# Patient Record
Sex: MALE | Race: WHITE | NOT HISPANIC OR LATINO | Employment: FULL TIME | URBAN - METROPOLITAN AREA
[De-identification: names, ages, dates, MRNs, and addresses within clinical notes are randomized per-mention and may not be internally consistent; named-entity substitution may affect disease eponyms.]

---

## 2025-02-25 PROBLEM — G43.719 INTRACTABLE CHRONIC MIGRAINE WITHOUT AURA AND WITHOUT STATUS MIGRAINOSUS: Status: ACTIVE | Noted: 2019-04-08

## 2025-02-25 PROBLEM — F41.8 DEPRESSION WITH ANXIETY: Status: ACTIVE | Noted: 2025-02-25

## 2025-02-25 PROBLEM — I10 BENIGN HYPERTENSION: Status: ACTIVE | Noted: 2025-02-25

## 2025-02-25 PROBLEM — M54.2 NECK PAIN: Status: ACTIVE | Noted: 2025-02-25

## 2025-02-25 PROBLEM — R53.83 FATIGUE: Status: ACTIVE | Noted: 2025-02-25

## 2025-02-25 NOTE — PROGRESS NOTES
Subjective   Patient ID:   Joshua Myles is a 44 y.o. male who presents for No chief complaint on file..  HPI  Previous [facility] patient.  Has not been seen since Feb 2023.    Neck pain:  Last x-ray in Feb 2021 showed mild retrolisthesis.  He has gone through PT.  Feeling better overall.  Was given Robaxin and Prednisone in the past.  No acute injury.  Took Excedrin without relief.  Movements make the pain worse.     Headaches:  Taking Imitrex which helps.  Had been seeing neurology.  These have been getting worse.  Has tried and failed Topamax.     HTN:  I refilled his BP medications last visit.  Taking Olmesartan-hydrochlorothiazide and Amlodipine.  BP today is      Fatigue:  Symptoms x years.  No recent labs.  Feeling sleepy during the day.     Depression/anxiety:  Taking Wellbutrin.  Denies SI/HI.     Health maintenance:  Smoking: Former smoker.  Labs: DUE  Influenza:     Review of Systems  12 point review of systems negative unless stated above in HPI    There were no vitals filed for this visit.    Physical Exam  General: Alert and oriented, well nourished, no acute distress.  Lungs: Clear to auscultation, non-labored respiration.  Heart: Normal rate, regular rhythm, no murmur, gallop or edema.  Neurologic: Awake, alert, and oriented X3, CN II-XII intact.  Psychiatric: Cooperative, appropriate mood and affect.    Assessment/Plan   Diagnoses and all orders for this visit:  Benign hypertension  Depression with anxiety  Chronic fatigue  Intractable chronic migraine without aura and without status migrainosus  Neck pain

## 2025-03-03 ENCOUNTER — APPOINTMENT (OUTPATIENT)
Dept: PRIMARY CARE | Facility: CLINIC | Age: 45
End: 2025-03-03

## 2025-03-03 DIAGNOSIS — M54.2 NECK PAIN: ICD-10-CM

## 2025-03-03 DIAGNOSIS — I10 BENIGN HYPERTENSION: Primary | ICD-10-CM

## 2025-03-03 DIAGNOSIS — G43.719 INTRACTABLE CHRONIC MIGRAINE WITHOUT AURA AND WITHOUT STATUS MIGRAINOSUS: ICD-10-CM

## 2025-03-03 DIAGNOSIS — F41.8 DEPRESSION WITH ANXIETY: ICD-10-CM

## 2025-03-03 DIAGNOSIS — R53.82 CHRONIC FATIGUE: ICD-10-CM

## 2025-03-24 NOTE — PROGRESS NOTES
Subjective   Patient ID:   Joshua Myles is a 44 y.o. male who presents for Establish Care, Migraine, and Hypertension.  HPI  Previous [facility] patient.  Has not been seen since Feb 2023.    Neck pain:  Last x-ray in Feb 2021 showed mild retrolisthesis.  He has gone through PT.  Was given Robaxin and Prednisone in the past.  No acute injury.  Took Excedrin without relief.  Movements make the pain worse.     Headaches:  Had previously been on Imitrex.  Had been seeing neurology.  These have been getting worse.  Has tried and failed Topamax.     HTN:  Supposed to be taking Olmesartan-hydrochlorothiazide and Amlodipine.  Has not had these in close to a year due to insurance.  BP today is 174/100.  He does get headaches frequently.     Fatigue:  Symptoms x years.  No recent labs.  Feeling sleepy during the day.     Depression/anxiety:  Taking Wellbutrin.  Denies SI/HI.     Health maintenance:  Smoking: Former smoker.  Labs: DUE  Influenza:     Review of Systems  12 point review of systems negative unless stated above in HPI    Vitals:    03/31/25 1013   BP: (!) 174/100   Pulse: 79   SpO2: 97%     Physical Exam  General: Alert and oriented, well nourished, no acute distress.  Lungs: Clear to auscultation, non-labored respiration.  Heart: Normal rate, regular rhythm, no murmur, gallop or edema.  Neurologic: Awake, alert, and oriented X3, CN II-XII intact.  Psychiatric: Cooperative, appropriate mood and affect.    Assessment/Plan   It was good seeing you again!  Your BP is high today.  Please start monitoring BP at home.  Call if getting over 140/90 regularly.  I have sent in Olmesartan-hydrochlorothiazide to restart.  I have also sent in Imitrex to restart.  I have placed a referral to physical therapy for further management.  I have ordered some labs to be done as soon as you can.  We will call you with the results.  If symptoms persist or worsen despite current plan of care, please contact your healthcare provider for  further evaluation.  Patient instructed to contact the office if there are any questions regarding their care or treatment.   [facility] [phone]    Fu 1 month for BP/headaches  Diagnoses and all orders for this visit:  Benign hypertension  -     olmesartan-hydrochlorothiazide (BENIcar HCT) 20-12.5 mg tablet; Take 1 tablet by mouth once daily.  Neck pain  -     Referral to Physical Therapy; Future  Intractable chronic migraine without aura and without status migrainosus  -     SUMAtriptan (Imitrex) 100 mg tablet; Take 1 tablet (100 mg) by mouth 1 time if needed for migraine.  Chronic fatigue  -     Comprehensive Metabolic Panel; Future  -     CBC and Auto Differential; Future  -     TSH with reflex to Free T4 if abnormal; Future  -     Vitamin D 25-Hydroxy,Total (for eval of Vitamin D levels); Future  -     Vitamin B12; Future  Depression with anxiety  Lipid screening  -     Lipid Panel; Future

## 2025-03-31 ENCOUNTER — OFFICE VISIT (OUTPATIENT)
Dept: PRIMARY CARE | Facility: CLINIC | Age: 45
End: 2025-03-31

## 2025-03-31 VITALS
OXYGEN SATURATION: 97 % | DIASTOLIC BLOOD PRESSURE: 100 MMHG | BODY MASS INDEX: 25.81 KG/M2 | HEART RATE: 79 BPM | WEIGHT: 190.3 LBS | SYSTOLIC BLOOD PRESSURE: 174 MMHG

## 2025-03-31 DIAGNOSIS — I10 BENIGN HYPERTENSION: Primary | ICD-10-CM

## 2025-03-31 DIAGNOSIS — F41.8 DEPRESSION WITH ANXIETY: ICD-10-CM

## 2025-03-31 DIAGNOSIS — M54.2 NECK PAIN: ICD-10-CM

## 2025-03-31 DIAGNOSIS — Z13.220 LIPID SCREENING: ICD-10-CM

## 2025-03-31 DIAGNOSIS — R53.82 CHRONIC FATIGUE: ICD-10-CM

## 2025-03-31 DIAGNOSIS — G43.719 INTRACTABLE CHRONIC MIGRAINE WITHOUT AURA AND WITHOUT STATUS MIGRAINOSUS: ICD-10-CM

## 2025-03-31 PROCEDURE — 3080F DIAST BP >= 90 MM HG: CPT | Performed by: PHYSICIAN ASSISTANT

## 2025-03-31 PROCEDURE — 3077F SYST BP >= 140 MM HG: CPT | Performed by: PHYSICIAN ASSISTANT

## 2025-03-31 PROCEDURE — 99214 OFFICE O/P EST MOD 30 MIN: CPT | Performed by: PHYSICIAN ASSISTANT

## 2025-03-31 PROCEDURE — 1036F TOBACCO NON-USER: CPT | Performed by: PHYSICIAN ASSISTANT

## 2025-03-31 RX ORDER — OLMESARTAN MEDOXOMIL AND HYDROCHLOROTHIAZIDE 20/12.5 20; 12.5 MG/1; MG/1
1 TABLET ORAL DAILY
Qty: 90 TABLET | Refills: 0 | Status: SHIPPED | OUTPATIENT
Start: 2025-03-31 | End: 2025-06-29

## 2025-03-31 RX ORDER — SUMATRIPTAN SUCCINATE 100 MG/1
100 TABLET ORAL ONCE AS NEEDED
Qty: 9 TABLET | Refills: 2 | Status: SHIPPED | OUTPATIENT
Start: 2025-03-31 | End: 2026-03-31

## 2025-03-31 ASSESSMENT — LIFESTYLE VARIABLES
HOW MANY STANDARD DRINKS CONTAINING ALCOHOL DO YOU HAVE ON A TYPICAL DAY: PATIENT DOES NOT DRINK
HOW OFTEN DO YOU HAVE SIX OR MORE DRINKS ON ONE OCCASION: NEVER
HOW OFTEN DO YOU HAVE A DRINK CONTAINING ALCOHOL: NEVER
SKIP TO QUESTIONS 9-10: 1
AUDIT-C TOTAL SCORE: 0

## 2025-03-31 ASSESSMENT — PATIENT HEALTH QUESTIONNAIRE - PHQ9
1. LITTLE INTEREST OR PLEASURE IN DOING THINGS: NOT AT ALL
2. FEELING DOWN, DEPRESSED OR HOPELESS: NOT AT ALL
SUM OF ALL RESPONSES TO PHQ9 QUESTIONS 1 AND 2: 0

## 2025-03-31 ASSESSMENT — PAIN SCALES - GENERAL: PAINLEVEL_OUTOF10: 2

## 2025-03-31 ASSESSMENT — ENCOUNTER SYMPTOMS
LOSS OF SENSATION IN FEET: 0
OCCASIONAL FEELINGS OF UNSTEADINESS: 0
DEPRESSION: 0

## 2025-07-25 DIAGNOSIS — I10 BENIGN HYPERTENSION: ICD-10-CM

## 2025-07-25 RX ORDER — OLMESARTAN MEDOXOMIL AND HYDROCHLOROTHIAZIDE 20/12.5 20; 12.5 MG/1; MG/1
1 TABLET ORAL DAILY
Qty: 90 TABLET | Refills: 3 | Status: SHIPPED | OUTPATIENT
Start: 2025-07-25 | End: 2026-07-20

## 2025-07-28 DIAGNOSIS — I10 BENIGN HYPERTENSION: ICD-10-CM

## 2025-07-28 RX ORDER — OLMESARTAN MEDOXOMIL AND HYDROCHLOROTHIAZIDE 20/12.5 20; 12.5 MG/1; MG/1
1 TABLET ORAL DAILY
Qty: 90 TABLET | Refills: 3 | Status: SHIPPED | OUTPATIENT
Start: 2025-07-28 | End: 2026-07-23